# Patient Record
Sex: MALE | Race: WHITE | URBAN - METROPOLITAN AREA
[De-identification: names, ages, dates, MRNs, and addresses within clinical notes are randomized per-mention and may not be internally consistent; named-entity substitution may affect disease eponyms.]

---

## 2017-03-24 ENCOUNTER — OFFICE VISIT (OUTPATIENT)
Dept: URGENT CARE | Facility: URGENT CARE | Age: 32
End: 2017-03-24
Payer: COMMERCIAL

## 2017-03-24 VITALS
DIASTOLIC BLOOD PRESSURE: 80 MMHG | WEIGHT: 315 LBS | TEMPERATURE: 98.4 F | BODY MASS INDEX: 53.28 KG/M2 | HEART RATE: 71 BPM | OXYGEN SATURATION: 96 % | SYSTOLIC BLOOD PRESSURE: 118 MMHG

## 2017-03-24 DIAGNOSIS — M54.9 BACK PAIN, UNSPECIFIED BACK LOCATION, UNSPECIFIED BACK PAIN LATERALITY, UNSPECIFIED CHRONICITY: Primary | ICD-10-CM

## 2017-03-24 PROCEDURE — 99203 OFFICE O/P NEW LOW 30 MIN: CPT | Performed by: FAMILY MEDICINE

## 2017-03-24 RX ORDER — METHYLPREDNISOLONE 4 MG
TABLET, DOSE PACK ORAL
Qty: 21 TABLET | Refills: 0 | Status: SHIPPED | OUTPATIENT
Start: 2017-03-24

## 2017-03-24 NOTE — PROGRESS NOTES
SUBJECTIVE:  Doug Villatoro is a 31 year old male complains of back pain. Moving off cough several days ago and thru out his back. This seems to happen every other year. Usually resolves wityh rest and steroids.. No fever or chills no pain in his legs. Normal bowel and bladder    OBJECTIVE:  /80 (BP Location: Right arm, Patient Position: Chair, Cuff Size: Adult Large)  Pulse 71  Temp 98.4  F (36.9  C) (Oral)  Wt (!) 382 lb (173.3 kg)  SpO2 96%  BMI 53.28 kg/m2  Exam; large body habitus    Pain to palp T10 S1 no radiating pain. Flexion is normal  Heart and lungs clear    ASSESSMENT:  1. Back pain;musculoskeletal    PLAN:  1. Medrol  2. Ibuprofen 600 mg TID for 5 days  3. If pain still present then have this reevaluated

## 2017-03-24 NOTE — MR AVS SNAPSHOT
After Visit Summary   3/24/2017    Doug Villatoro    MRN: [id number]           Patient Information     Date Of Birth          1985        Visit Information        Provider Department      3/24/2017 9:15 AM Rosalino Mancilla MD [facility] [place] [facility]        Today's Diagnoses     Back pain, unspecified back location, unspecified back pain laterality, unspecified chronicity    -  1       Follow-ups after your visit        Who to contact     If you have questions or need follow up information about today's clinic visit or your schedule please contact [facility] directly at [phone].  Normal or non-critical lab and imaging results will be communicated to you by tabulatehart, letter or phone within 4 business days after the clinic has received the results. If you do not hear from us within 7 days, please contact the clinic through [employer] or phone. If you have a critical or abnormal lab result, we will notify you by phone as soon as possible.  Submit refill requests through [employer] or call your pharmacy and they will forward the refill request to us. Please allow 3 business days for your refill to be completed.          Additional Information About Your Visit        MyChart Information     [employer] gives you secure access to your electronic health record. If you see a primary care provider, you can also send messages to your care team and make appointments. If you have questions, please call your primary care clinic.  If you do not have a primary care provider, please call [phone] and they will assist you.        Care EveryWhere ID     This is your Care EveryWhere ID. This could be used by other organizations to access your [facility] records  [id number]        Your Vitals Were     Pulse Temperature Pulse Oximetry BMI (Body Mass Index)          71 98.4  F (36.9  C) (Oral) 96% 53.28 kg/m2         Blood Pressure from Last 3 Encounters:   03/24/17 118/80   11/08/13 110/80    09/11/13 140/84    Weight from Last 3 Encounters:   03/24/17 (!) 382 lb (173.3 kg)   11/08/13 (!) 335 lb 6.4 oz (152.1 kg)   09/11/13 (!) 320 lb (145.2 kg)              Today, you had the following     No orders found for display       Primary Care Provider Office Phone # Fax #    Daniel Ocasio [phone]       [facility] [address] [place] [address]        Thank you!     Thank you for choosing [facility]  for your care. Our goal is always to provide you with excellent care. Hearing back from our patients is one way we can continue to improve our services. Please take a few minutes to complete the written survey that you may receive in the mail after your visit with us. Thank you!             Your Updated Medication List - Protect others around you: Learn how to safely use, store and throw away your medicines at www.disposemymeds.org.          This list is accurate as of: 3/24/17  9:45 AM.  Always use your most recent med list.                   Brand Name Dispense Instructions for use    LEXAPRO PO          * sildenafil 50 MG cap/tab    REVATIO/VIAGRA    3 tablet    Take 0.5 tablets (25 mg) by mouth daily as needed for erectile dysfunction Take 30 min to 4 hours before intercourse.  Never use with nitroglycerin, terazosin or doxazosin.       * sildenafil 50 MG cap/tab    REVATIO/VIAGRA    15 tablet    Take 1 tablet (50 mg) by mouth daily as needed for erectile dysfunction Take 30 min to 4 hours before intercourse.  Never use with nitroglycerin, terazosin or doxazosin.       * Notice:  This list has 2 medication(s) that are the same as other medications prescribed for you. Read the directions carefully, and ask your doctor or other care provider to review them with you.

## 2017-03-24 NOTE — NURSING NOTE
"Chief Complaint   Patient presents with     Back Pain     lower middle back       Initial /80 (BP Location: Right arm, Patient Position: Chair, Cuff Size: Adult Large)  Pulse 71  Temp 98.4  F (36.9  C) (Oral)  Wt (!) 382 lb (173.3 kg)  SpO2 96%  BMI 53.28 kg/m2 Estimated body mass index is 53.28 kg/(m^2) as calculated from the following:    Height as of 11/8/13: 5' 11\" (1.803 m).    Weight as of this encounter: 382 lb (173.3 kg).  BP completed using cuff size: tyra Blackburn MA    "

## 2017-03-24 NOTE — LETTER
[facility]  [address]  [address]  [place] [address]  Phone: [phone]  Fax: [phone]    March 24, 2017        Doug Villatoro  [address]   [place] [address]          To whom it may concern:    RE: Doug Villatoro    Patient was seen and treated today at our clinic. Unable to work for the next 2 days.    Please contact me for questions or concerns.      Sincerely,        Rosalino Mancilla MD

## 2020-02-23 ENCOUNTER — HEALTH MAINTENANCE LETTER (OUTPATIENT)
Age: 35
End: 2020-02-23